# Patient Record
Sex: FEMALE | Race: WHITE | Employment: OTHER
[De-identification: names, ages, dates, MRNs, and addresses within clinical notes are randomized per-mention and may not be internally consistent; named-entity substitution may affect disease eponyms.]

---

## 2018-01-31 ENCOUNTER — SURGERY (OUTPATIENT)
Age: 78
End: 2018-01-31

## 2018-01-31 ENCOUNTER — HOSPITAL ENCOUNTER (OUTPATIENT)
Facility: CLINIC | Age: 78
Discharge: HOME | End: 2018-01-31
Attending: COLON & RECTAL SURGERY | Admitting: COLON & RECTAL SURGERY
Payer: MEDICARE

## 2018-01-31 VITALS
WEIGHT: 148 LBS | BODY MASS INDEX: 23.78 KG/M2 | HEIGHT: 66 IN | OXYGEN SATURATION: 99 % | DIASTOLIC BLOOD PRESSURE: 60 MMHG | SYSTOLIC BLOOD PRESSURE: 90 MMHG | RESPIRATION RATE: 28 BRPM

## 2018-01-31 LAB — COLONOSCOPY: NORMAL

## 2018-01-31 PROCEDURE — G0500 MOD SEDAT ENDO SERVICE >5YRS: HCPCS | Performed by: COLON & RECTAL SURGERY

## 2018-01-31 PROCEDURE — 88305 TISSUE EXAM BY PATHOLOGIST: CPT | Performed by: COLON & RECTAL SURGERY

## 2018-01-31 PROCEDURE — 25000128 H RX IP 250 OP 636: Performed by: COLON & RECTAL SURGERY

## 2018-01-31 PROCEDURE — 88305 TISSUE EXAM BY PATHOLOGIST: CPT | Mod: 26 | Performed by: COLON & RECTAL SURGERY

## 2018-01-31 PROCEDURE — 45380 COLONOSCOPY AND BIOPSY: CPT | Performed by: COLON & RECTAL SURGERY

## 2018-01-31 PROCEDURE — 45385 COLONOSCOPY W/LESION REMOVAL: CPT | Performed by: COLON & RECTAL SURGERY

## 2018-01-31 RX ORDER — ONDANSETRON 2 MG/ML
4 INJECTION INTRAMUSCULAR; INTRAVENOUS
Status: DISCONTINUED | OUTPATIENT
Start: 2018-01-31 | End: 2018-01-31 | Stop reason: HOSPADM

## 2018-01-31 RX ORDER — FENTANYL CITRATE 50 UG/ML
INJECTION, SOLUTION INTRAMUSCULAR; INTRAVENOUS PRN
Status: DISCONTINUED | OUTPATIENT
Start: 2018-01-31 | End: 2018-01-31 | Stop reason: HOSPADM

## 2018-01-31 RX ORDER — FLUMAZENIL 0.1 MG/ML
0.2 INJECTION, SOLUTION INTRAVENOUS
Status: DISCONTINUED | OUTPATIENT
Start: 2018-01-31 | End: 2018-01-31 | Stop reason: HOSPADM

## 2018-01-31 RX ORDER — LIDOCAINE 40 MG/G
CREAM TOPICAL
Status: DISCONTINUED | OUTPATIENT
Start: 2018-01-31 | End: 2018-01-31 | Stop reason: HOSPADM

## 2018-01-31 RX ORDER — NALOXONE HYDROCHLORIDE 0.4 MG/ML
.1-.4 INJECTION, SOLUTION INTRAMUSCULAR; INTRAVENOUS; SUBCUTANEOUS
Status: DISCONTINUED | OUTPATIENT
Start: 2018-01-31 | End: 2018-01-31 | Stop reason: HOSPADM

## 2018-01-31 RX ORDER — LOSARTAN POTASSIUM 25 MG/1
12.5 TABLET ORAL
COMMUNITY
Start: 2017-02-03

## 2018-01-31 RX ORDER — ONDANSETRON 2 MG/ML
4 INJECTION INTRAMUSCULAR; INTRAVENOUS EVERY 6 HOURS PRN
Status: DISCONTINUED | OUTPATIENT
Start: 2018-01-31 | End: 2018-01-31 | Stop reason: HOSPADM

## 2018-01-31 RX ORDER — ONDANSETRON 4 MG/1
4 TABLET, ORALLY DISINTEGRATING ORAL EVERY 6 HOURS PRN
Status: DISCONTINUED | OUTPATIENT
Start: 2018-01-31 | End: 2018-01-31 | Stop reason: HOSPADM

## 2018-01-31 RX ORDER — PENCICLOVIR 10 MG/G
CREAM TOPICAL
COMMUNITY
Start: 2017-12-11

## 2018-01-31 RX ADMIN — MIDAZOLAM 2 MG: 1 INJECTION INTRAMUSCULAR; INTRAVENOUS at 09:40

## 2018-01-31 RX ADMIN — FENTANYL CITRATE 100 MCG: 50 INJECTION, SOLUTION INTRAMUSCULAR; INTRAVENOUS at 09:40

## 2018-01-31 NOTE — H&P
Pre-Endoscopy History and Physical     Marilee Centeno MRN# [id number]   YOB: 1940 Age: 77 year old     Date of Procedure: 1/31/2018  Primary care provider: Irene Ritter  Type of Endoscopy: colonoscopy  Reason for Procedure: screening  Type of Anesthesia Anticipated: moderate sedation    HPI:    Marilee is a 77 year old female who will be undergoing the above procedure.  Patient has had adenomatous polyps in the past. Patient denies a change in her bowel habits or bleeding.     A history and physical has been performed. The patient's medications and allergies have been reviewed. The risks and benefits of the procedure and the sedation options and risks were discussed with the patient.  All questions were answered and informed consent was obtained.      She denies a personal or family history of anesthesia complications or bleeding disorders.   Prior to Admission medications    Medication Sig Start Date End Date Taking? Authorizing Provider   losartan (COZAAR) 25 MG tablet Take 12.5 mg by mouth 2/3/17  Yes Reported, Patient   penciclovir (DENAVIR) 1 % cream  12/11/17  Yes Reported, Patient   clonazePAM (KLONOPIN WAFER) 0.125 MG TBDP Take 0.125 mg by mouth daily As needed for anxiety 9/18/13  Yes Urmila Frost MD   Calcium Carbonate-Vitamin D (CALCIUM + D PO) Take  by mouth 2 times daily.   Yes Reported, Patient   atorvastatin (LIPITOR) 10 MG tablet Take 10 mg by mouth daily.   Yes Reported, Patient   Multiple Vitamin (MULTIVITAMIN OR) Take 1 tablet by mouth daily.   Yes Reported, Patient   ALBUTEROL IN Inhale  into the lungs as needed.   Yes Reported, Patient   Montelukast Sodium (SINGULAIR PO) Take  by mouth as needed.   Yes Reported, Patient   mometasone (NASONEX) 50 MCG/ACT nasal spray 2 sprays by Both Nostrils route as needed.    Reported, Patient       Allergies   Allergen Reactions     Amoxicillin      Facial swelling and facial flushing     Cortisone      Facial flushing and  "swelling     Medrol [Methylprednisolone]      Facial swelling     Meloxicam      Other reaction(s): Hypertension     Methylprednisolone      Other reaction(s): Flushing  Last 3 days     Nsaids      Other reaction(s): GI Upset        No current facility-administered medications for this encounter.        Patient Active Problem List   Diagnosis     Malignant neoplasm of breast (H)        Past Medical History:   Diagnosis Date     Arthritis     Osteoarthritis     Asthma     Exercise/Seasonally Induced Asthma     Malignant neoplasm (H) 9/30/1996    Breast Cancer        Past Surgical History:   Procedure Laterality Date     APPENDECTOMY  age 15    Appendectomy     BIOPSY  9/30/1996    Right Breast Biopsy     BIOPSY  10/14/2009    Colon Polyp - Benign     BIOPSY  7/2012    medistinal-      BREAST SURGERY  10/10/1996    Right Lumpectomy     BREAST SURGERY  1981    Bilateral Breast Reduction     COLONOSCOPY  10/2009    Colonoscopy     COLONOSCOPY  1/23/2013    Procedure: COLONOSCOPY;  COLONOSCOPY ;  Surgeon: Camila Iyer MD;  Location:  GI     COSMETIC SURGERY  2008    Upper Eyelids     EYE SURGERY      cataract surgery     GYN SURGERY  2003    GERMAIN/BSO     ORTHOPEDIC SURGERY  1985    Right Knee repair       Social History   Substance Use Topics     Smoking status: Never Smoker     Smokeless tobacco: Never Used     Alcohol use Yes       Family History   Problem Relation Age of Onset     Cancer - colorectal Mother      C.A.D. Father      Colon Cancer Maternal Aunt        REVIEW OF SYSTEMS:     5 point ROS negative except as noted above in HPI, including Gen., Resp., CV, GI &  system review. Had pneumonia this summer.       PHYSICAL EXAM:   /73  Resp 16  Ht 1.676 m (5' 6\")  Wt 67.1 kg (148 lb)  LMP 11/10/1990  SpO2 99%  BMI 23.89 kg/m2 Estimated body mass index is 23.89 kg/(m^2) as calculated from the following:    Height as of this encounter: 1.676 m (5' 6\").    Weight as of this encounter: 67.1 kg " (148 lb).   GENERAL APPEARANCE: healthy  MENTAL STATUS: alert  AIRWAY EXAM: Mallampatti Class II (visualization of the soft palate, fauces, and uvula)  RESP: lungs clear to auscultation - no rales, rhonchi or wheezes  CV: regular rates and rhythm      DIAGNOSTICS:    Not indicated      IMPRESSION   ASA Class 2 - Mild systemic disease        PLAN:       Colonoscopy with possible polypectomy, possible biopsy. The indications, procedure and risks were explained to the patient who agrees to proceed.       The above has been forwarded to the consulting provider.      Signed Electronically by: Camila Iyer  January 31, 2018

## 2018-01-31 NOTE — BRIEF OP NOTE
[facility] Brief Operative Note    Pre-operative diagnosis: FAMILY HISTORY OF COLORECTAL CANCER AND PERSONAL HISTORY OF COLONIC POLYPS    Post-operative diagnosis colon polyps     Procedure: Procedure(s):  COLONOSCOPY - Wound Class: II-Clean Contaminated   - Wound Class: II-Clean Contaminated   Surgeon(s): Surgeon(s) and Role:     * Camila Iyer MD - Primary   Estimated blood loss: * No values recorded between 1/31/2018 12:00 AM and 1/31/2018 10:02 AM *    Specimens:   ID Type Source Tests Collected by Time Destination   A :  Polyp Large Intestine, Hepatic Flexure SURGICAL PATHOLOGY EXAM Camila Iyer MD 1/31/2018  9:59 AM    B :  Polyp Large Intestine, Transverse SURGICAL PATHOLOGY EXAM Camila Iyer MD 1/31/2018 10:00 AM       Findings: See Provation procedure note in Epic

## 2018-02-01 LAB — COPATH REPORT: NORMAL

## 2020-09-09 DIAGNOSIS — Z11.59 ENCOUNTER FOR SCREENING FOR OTHER VIRAL DISEASES: Primary | ICD-10-CM

## 2021-03-14 ENCOUNTER — HEALTH MAINTENANCE LETTER (OUTPATIENT)
Age: 81
End: 2021-03-14

## 2021-04-06 DIAGNOSIS — Z11.59 ENCOUNTER FOR SCREENING FOR OTHER VIRAL DISEASES: ICD-10-CM

## 2021-04-17 DIAGNOSIS — Z11.59 ENCOUNTER FOR SCREENING FOR OTHER VIRAL DISEASES: ICD-10-CM

## 2021-04-17 LAB
SARS-COV-2 RNA RESP QL NAA+PROBE: NORMAL
SPECIMEN SOURCE: NORMAL

## 2021-04-17 PROCEDURE — U0003 INFECTIOUS AGENT DETECTION BY NUCLEIC ACID (DNA OR RNA); SEVERE ACUTE RESPIRATORY SYNDROME CORONAVIRUS 2 (SARS-COV-2) (CORONAVIRUS DISEASE [COVID-19]), AMPLIFIED PROBE TECHNIQUE, MAKING USE OF HIGH THROUGHPUT TECHNOLOGIES AS DESCRIBED BY CMS-2020-01-R: HCPCS | Performed by: COLON & RECTAL SURGERY

## 2021-04-17 PROCEDURE — U0005 INFEC AGEN DETEC AMPLI PROBE: HCPCS | Performed by: COLON & RECTAL SURGERY

## 2021-04-18 LAB
LABORATORY COMMENT REPORT: NORMAL
SARS-COV-2 RNA RESP QL NAA+PROBE: NEGATIVE
SPECIMEN SOURCE: NORMAL

## 2021-04-19 ENCOUNTER — TELEPHONE (OUTPATIENT)
Dept: EMERGENCY MEDICINE | Facility: CLINIC | Age: 81
End: 2021-04-19

## 2021-04-19 NOTE — TELEPHONE ENCOUNTER
Patient called in to the Covid results line requesting Covid results.  Patient was tested for a planned procedure and results were negative. No other questions or concerns.   Meg Fuentes LPN

## 2021-04-21 ENCOUNTER — HOSPITAL ENCOUNTER (OUTPATIENT)
Facility: CLINIC | Age: 81
Discharge: HOME | End: 2021-04-21
Attending: COLON & RECTAL SURGERY | Admitting: COLON & RECTAL SURGERY
Payer: COMMERCIAL

## 2021-04-21 VITALS — OXYGEN SATURATION: 98 % | DIASTOLIC BLOOD PRESSURE: 65 MMHG | SYSTOLIC BLOOD PRESSURE: 104 MMHG | HEART RATE: 64 BPM

## 2021-04-21 PROBLEM — C37: Status: ACTIVE | Noted: 2017-08-14

## 2021-04-21 PROBLEM — J45.20 MILD INTERMITTENT ASTHMA WITHOUT COMPLICATION: Status: ACTIVE | Noted: 2018-12-27

## 2021-04-21 PROBLEM — M17.11 PRIMARY OSTEOARTHRITIS OF RIGHT KNEE: Status: ACTIVE | Noted: 2017-09-26

## 2021-04-21 PROBLEM — F41.9 ANXIETY: Status: ACTIVE | Noted: 2018-04-17

## 2021-04-21 LAB — COLONOSCOPY: NORMAL

## 2021-04-21 PROCEDURE — 45378 DIAGNOSTIC COLONOSCOPY: CPT | Performed by: COLON & RECTAL SURGERY

## 2021-04-21 PROCEDURE — G0500 MOD SEDAT ENDO SERVICE >5YRS: HCPCS | Performed by: COLON & RECTAL SURGERY

## 2021-04-21 PROCEDURE — G0105 COLORECTAL SCRN; HI RISK IND: HCPCS | Performed by: COLON & RECTAL SURGERY

## 2021-04-21 PROCEDURE — 250N000011 HC RX IP 250 OP 636: Performed by: COLON & RECTAL SURGERY

## 2021-04-21 RX ORDER — NALOXONE HYDROCHLORIDE 0.4 MG/ML
0.4 INJECTION, SOLUTION INTRAMUSCULAR; INTRAVENOUS; SUBCUTANEOUS
Status: DISCONTINUED | OUTPATIENT
Start: 2021-04-21 | End: 2021-04-21 | Stop reason: HOSPADM

## 2021-04-21 RX ORDER — NALOXONE HYDROCHLORIDE 0.4 MG/ML
0.2 INJECTION, SOLUTION INTRAMUSCULAR; INTRAVENOUS; SUBCUTANEOUS
Status: DISCONTINUED | OUTPATIENT
Start: 2021-04-21 | End: 2021-04-21 | Stop reason: HOSPADM

## 2021-04-21 RX ORDER — ONDANSETRON 2 MG/ML
4 INJECTION INTRAMUSCULAR; INTRAVENOUS
Status: DISCONTINUED | OUTPATIENT
Start: 2021-04-21 | End: 2021-04-21 | Stop reason: HOSPADM

## 2021-04-21 RX ORDER — ONDANSETRON 2 MG/ML
4 INJECTION INTRAMUSCULAR; INTRAVENOUS EVERY 6 HOURS PRN
Status: DISCONTINUED | OUTPATIENT
Start: 2021-04-21 | End: 2021-04-21 | Stop reason: HOSPADM

## 2021-04-21 RX ORDER — LIDOCAINE 40 MG/G
CREAM TOPICAL
Status: DISCONTINUED | OUTPATIENT
Start: 2021-04-21 | End: 2021-04-21 | Stop reason: HOSPADM

## 2021-04-21 RX ORDER — FENTANYL CITRATE 50 UG/ML
INJECTION, SOLUTION INTRAMUSCULAR; INTRAVENOUS PRN
Status: COMPLETED | OUTPATIENT
Start: 2021-04-21 | End: 2021-04-21

## 2021-04-21 RX ORDER — ONDANSETRON 4 MG/1
4 TABLET, ORALLY DISINTEGRATING ORAL EVERY 6 HOURS PRN
Status: DISCONTINUED | OUTPATIENT
Start: 2021-04-21 | End: 2021-04-21 | Stop reason: HOSPADM

## 2021-04-21 RX ORDER — PROCHLORPERAZINE MALEATE 5 MG
5 TABLET ORAL EVERY 6 HOURS PRN
Status: DISCONTINUED | OUTPATIENT
Start: 2021-04-21 | End: 2021-04-21 | Stop reason: HOSPADM

## 2021-04-21 RX ORDER — FLUMAZENIL 0.1 MG/ML
0.2 INJECTION, SOLUTION INTRAVENOUS
Status: DISCONTINUED | OUTPATIENT
Start: 2021-04-21 | End: 2021-04-21 | Stop reason: HOSPADM

## 2021-04-21 RX ADMIN — MIDAZOLAM 1 MG: 1 INJECTION INTRAMUSCULAR; INTRAVENOUS at 08:05

## 2021-04-21 RX ADMIN — FENTANYL CITRATE 100 MCG: 50 INJECTION, SOLUTION INTRAMUSCULAR; INTRAVENOUS at 07:58

## 2021-04-21 RX ADMIN — MIDAZOLAM 1 MG: 1 INJECTION INTRAMUSCULAR; INTRAVENOUS at 07:58

## 2021-04-21 NOTE — H&P
Pre-Endoscopy History and Physical     Marilee Centeno MRN# [id number]   YOB: 1940 Age: 80 year old     Date of Procedure: 4/21/2021  Primary care provider: Irene Ritter  Type of Endoscopy: colonoscopy  Reason for Procedure: screening, history of polyps, family history of colon cancer  Type of Anesthesia Anticipated: moderate sedation    HPI:    Marilee is a 80 year old female who will be undergoing the above procedure.  Patient pola a change inher bowel habits or bleeding.         She denies a personal or family history of anesthesia complications or bleeding disorders.   Prior to Admission medications    Medication Sig Start Date End Date Taking? Authorizing Provider   ALBUTEROL IN Inhale  into the lungs as needed.   Yes Reported, Patient   atorvastatin (LIPITOR) 10 MG tablet Take 10 mg by mouth daily.   Yes Reported, Patient   Calcium Carbonate-Vitamin D (CALCIUM + D PO) Take  by mouth 2 times daily.   Yes Reported, Patient   clonazePAM (KLONOPIN WAFER) 0.125 MG TBDP Take 0.125 mg by mouth daily As needed for anxiety 9/18/13  Yes Urmila Frost MD   losartan (COZAAR) 25 MG tablet Take 12.5 mg by mouth 2/3/17  Yes Reported, Patient   mometasone (NASONEX) 50 MCG/ACT nasal spray 2 sprays by Both Nostrils route as needed.   Yes Reported, Patient   Montelukast Sodium (SINGULAIR PO) Take  by mouth as needed.   Yes Reported, Patient   Multiple Vitamin (MULTIVITAMIN OR) Take 1 tablet by mouth daily.   Yes Reported, Patient   penciclovir (DENAVIR) 1 % cream  12/11/17  Yes Reported, Patient       Allergies   Allergen Reactions     Amoxicillin      Facial swelling and facial flushing     Cortisone      Facial flushing and swelling     Medrol [Methylprednisolone]      Facial swelling     Meloxicam      Other reaction(s): Hypertension     Methylprednisolone      Other reaction(s): Flushing  Last 3 days     Nsaids      Other reaction(s): GI Upset        Current Facility-Administered Medications    Medication     lidocaine (LMX4) cream     lidocaine 1 % 0.1-1 mL     ondansetron (ZOFRAN) injection 4 mg     sodium chloride (PF) 0.9% PF flush 3 mL     sodium chloride (PF) 0.9% PF flush 3 mL     sodium chloride (PF) 0.9% PF flush 3 mL       Patient Active Problem List   Diagnosis     Malignant neoplasm of breast (H)     Anxiety     Encounter for screening for osteoporosis     Family history of malignant neoplasm of gastrointestinal tract     Helicobacter pylori infection     History of colonic polyps     Hyperlipidemia     Hypertension     Lumbar spinal stenosis     Macrocytosis     Malignant thymoma (H)     Migraine variant     Mild intermittent asthma without complication     Other and unspecified disc disorder of cervical region     Abdominal pain     Other chest pain     Primary osteoarthritis of right knee     Primary spindle cell thymoma of mediastinum (H)     Malignant neoplasm of female breast (H)        Past Medical History:   Diagnosis Date     Arthritis     Osteoarthritis     Asthma     Exercise/Seasonally Induced Asthma     Hypertension      Malignant neoplasm (H) 9/30/1996    Breast Cancer        Past Surgical History:   Procedure Laterality Date     APPENDECTOMY  age 15    Appendectomy     BIOPSY  9/30/1996    Right Breast Biopsy     BIOPSY  10/14/2009    Colon Polyp - Benign     BIOPSY  7/2012    medistinal-      BREAST SURGERY  10/10/1996    Right Lumpectomy     BREAST SURGERY  1981    Bilateral Breast Reduction     COLONOSCOPY  10/2009    Colonoscopy     COLONOSCOPY  1/23/2013    Procedure: COLONOSCOPY;  COLONOSCOPY ;  Surgeon: Camila Iyer MD;  Location:  GI     COLONOSCOPY N/A 1/31/2018    Procedure: COMBINED COLONOSCOPY, SINGLE OR MULTIPLE BIOPSY/POLYPECTOMY BY BIOPSY;;  Surgeon: Camila Iyer MD;  Location:  GI     COSMETIC SURGERY  2008    Upper Eyelids     EYE SURGERY      cataract surgery     GYN SURGERY  2003    GERMAIN/BSO     ORTHOPEDIC SURGERY  1985    Right Knee  "repair       Social History     Tobacco Use     Smoking status: Never Smoker     Smokeless tobacco: Never Used   Substance Use Topics     Alcohol use: Yes     Comment: occ       Family History   Problem Relation Age of Onset     Cancer - colorectal Mother      C.A.D. Father      Colon Cancer Maternal Aunt        REVIEW OF SYSTEMS:     5 point ROS negative except as noted above in HPI, including Gen., Resp., CV, GI &  system review.      PHYSICAL EXAM:   BP (!) 148/82   Resp 16   LMP 11/10/1990   SpO2 98%  Estimated body mass index is 23.89 kg/m  as calculated from the following:    Height as of 1/31/18: 1.676 m (5' 6\").    Weight as of 1/31/18: 67.1 kg (148 lb).   GENERAL APPEARANCE: healthy  MENTAL STATUS: alert  AIRWAY EXAM: Mallampatti Class II (visualization of the soft palate, fauces, and uvula)  RESP: lungs clear to auscultation - no rales, rhonchi or wheezes  CV: regular rates and rhythm      DIAGNOSTICS:    Not indicated      IMPRESSION   ASA Class 2 - Mild systemic disease        PLAN:       A history and physical has been performed. The patient's medications and allergies have been reviewed. The risks and benefits of the procedure and the sedation options and risks were discussed with the patient.  All questions were answered and informed consent was obtained.  Colonoscopy with possible polypectomy, possible biopsy. The indications, procedure and risks were explained to the patient who agrees to proceed.       The above has been forwarded to the consulting provider.      Signed Electronically by: Camila Iyer MD  April 21, 2021          "

## 2021-04-21 NOTE — BRIEF OP NOTE
[facility]    Brief Operative Note    Pre-operative diagnosis: Hx of colonic polyps [Z86.010]  Post-operative diagnosis normal colon    Procedure: Procedure(s):  COLONOSCOPY  Surgeon: Surgeon(s) and Role:     * Camila Iyer MD - Primary  Anesthesia: Conscious Sedation   Estimated blood loss: None  Drains: None  Specimens: * No specimens in log *  Findings:   See Provation procedure note in Epic    Complications: None.  Implants: * No implants in log *

## 2021-10-24 ENCOUNTER — HEALTH MAINTENANCE LETTER (OUTPATIENT)
Age: 81
End: 2021-10-24

## 2022-04-09 ENCOUNTER — HEALTH MAINTENANCE LETTER (OUTPATIENT)
Age: 82
End: 2022-04-09

## 2022-10-10 ENCOUNTER — HEALTH MAINTENANCE LETTER (OUTPATIENT)
Age: 82
End: 2022-10-10

## 2023-05-27 ENCOUNTER — HEALTH MAINTENANCE LETTER (OUTPATIENT)
Age: 83
End: 2023-05-27

## 2024-03-17 ENCOUNTER — HEALTH MAINTENANCE LETTER (OUTPATIENT)
Age: 84
End: 2024-03-17

## 2024-10-21 ENCOUNTER — HOSPITAL ENCOUNTER (EMERGENCY)
Facility: CLINIC | Age: 84
Discharge: HOME | End: 2024-10-21
Admitting: EMERGENCY MEDICINE
Payer: COMMERCIAL

## 2024-10-21 ASSESSMENT — COLUMBIA-SUICIDE SEVERITY RATING SCALE - C-SSRS
2. HAVE YOU ACTUALLY HAD ANY THOUGHTS OF KILLING YOURSELF IN THE PAST MONTH?: NO
1. IN THE PAST MONTH, HAVE YOU WISHED YOU WERE DEAD OR WISHED YOU COULD GO TO SLEEP AND NOT WAKE UP?: NO
6. HAVE YOU EVER DONE ANYTHING, STARTED TO DO ANYTHING, OR PREPARED TO DO ANYTHING TO END YOUR LIFE?: NO

## 2024-10-21 ASSESSMENT — ACTIVITIES OF DAILY LIVING (ADL): ADLS_ACUITY_SCORE: 35

## 2024-12-21 ENCOUNTER — HEALTH MAINTENANCE LETTER (OUTPATIENT)
Age: 84
End: 2024-12-21

## 2025-02-28 ENCOUNTER — HOSPITAL ENCOUNTER (EMERGENCY)
Facility: CLINIC | Age: 85
Discharge: HOME | End: 2025-02-28
Attending: EMERGENCY MEDICINE | Admitting: EMERGENCY MEDICINE
Payer: COMMERCIAL

## 2025-02-28 ENCOUNTER — APPOINTMENT (OUTPATIENT)
Dept: GENERAL RADIOLOGY | Facility: CLINIC | Age: 85
End: 2025-02-28
Attending: EMERGENCY MEDICINE
Payer: COMMERCIAL

## 2025-02-28 VITALS
RESPIRATION RATE: 16 BRPM | TEMPERATURE: 98.1 F | WEIGHT: 140 LBS | OXYGEN SATURATION: 100 % | DIASTOLIC BLOOD PRESSURE: 78 MMHG | BODY MASS INDEX: 27.48 KG/M2 | HEART RATE: 70 BPM | HEIGHT: 60 IN | SYSTOLIC BLOOD PRESSURE: 156 MMHG

## 2025-02-28 DIAGNOSIS — Z63.6 CAREGIVER STRESS: ICD-10-CM

## 2025-02-28 DIAGNOSIS — G56.01 CARPAL TUNNEL SYNDROME OF RIGHT WRIST: ICD-10-CM

## 2025-02-28 DIAGNOSIS — R07.89 CHEST DISCOMFORT: ICD-10-CM

## 2025-02-28 LAB
ALBUMIN SERPL BCG-MCNC: 4.1 G/DL (ref 3.5–5.2)
ALP SERPL-CCNC: 105 U/L (ref 40–150)
ALT SERPL W P-5'-P-CCNC: 16 U/L (ref 0–50)
ANION GAP SERPL CALCULATED.3IONS-SCNC: 11 MMOL/L (ref 7–15)
AST SERPL W P-5'-P-CCNC: 26 U/L (ref 0–45)
ATRIAL RATE - MUSE: 74 BPM
BASOPHILS # BLD AUTO: 0.1 10E3/UL (ref 0–0.2)
BASOPHILS NFR BLD AUTO: 1 %
BILIRUB SERPL-MCNC: 0.4 MG/DL
BUN SERPL-MCNC: 14.9 MG/DL (ref 8–23)
CALCIUM SERPL-MCNC: 8.9 MG/DL (ref 8.8–10.4)
CHLORIDE SERPL-SCNC: 103 MMOL/L (ref 98–107)
CREAT SERPL-MCNC: 0.7 MG/DL (ref 0.51–0.95)
DIASTOLIC BLOOD PRESSURE - MUSE: NORMAL MMHG
EGFRCR SERPLBLD CKD-EPI 2021: 85 ML/MIN/1.73M2
EOSINOPHIL # BLD AUTO: 0 10E3/UL (ref 0–0.7)
EOSINOPHIL NFR BLD AUTO: 1 %
ERYTHROCYTE [DISTWIDTH] IN BLOOD BY AUTOMATED COUNT: 12.4 % (ref 10–15)
GLUCOSE SERPL-MCNC: 101 MG/DL (ref 70–99)
HCO3 SERPL-SCNC: 25 MMOL/L (ref 22–29)
HCT VFR BLD AUTO: 34.2 % (ref 35–47)
HGB BLD-MCNC: 12 G/DL (ref 11.7–15.7)
HOLD SPECIMEN: NORMAL
IMM GRANULOCYTES # BLD: 0 10E3/UL
IMM GRANULOCYTES NFR BLD: 0 %
INTERPRETATION ECG - MUSE: NORMAL
LIPASE SERPL-CCNC: 46 U/L (ref 13–60)
LYMPHOCYTES # BLD AUTO: 1.2 10E3/UL (ref 0.8–5.3)
LYMPHOCYTES NFR BLD AUTO: 19 %
MCH RBC QN AUTO: 34.9 PG (ref 26.5–33)
MCHC RBC AUTO-ENTMCNC: 35.1 G/DL (ref 31.5–36.5)
MCV RBC AUTO: 99 FL (ref 78–100)
MONOCYTES # BLD AUTO: 0.2 10E3/UL (ref 0–1.3)
MONOCYTES NFR BLD AUTO: 3 %
NEUTROPHILS # BLD AUTO: 4.7 10E3/UL (ref 1.6–8.3)
NEUTROPHILS NFR BLD AUTO: 76 %
NRBC # BLD AUTO: 0 10E3/UL
NRBC BLD AUTO-RTO: 0 /100
P AXIS - MUSE: 76 DEGREES
PLATELET # BLD AUTO: 264 10E3/UL (ref 150–450)
POTASSIUM SERPL-SCNC: 4.2 MMOL/L (ref 3.4–5.3)
PR INTERVAL - MUSE: 162 MS
PROT SERPL-MCNC: 7.1 G/DL (ref 6.4–8.3)
QRS DURATION - MUSE: 82 MS
QT - MUSE: 392 MS
QTC - MUSE: 435 MS
R AXIS - MUSE: 61 DEGREES
RBC # BLD AUTO: 3.44 10E6/UL (ref 3.8–5.2)
SODIUM SERPL-SCNC: 139 MMOL/L (ref 135–145)
SYSTOLIC BLOOD PRESSURE - MUSE: NORMAL MMHG
T AXIS - MUSE: 80 DEGREES
TROPONIN T SERPL HS-MCNC: 11 NG/L
TROPONIN T SERPL HS-MCNC: 12 NG/L
VENTRICULAR RATE- MUSE: 74 BPM
WBC # BLD AUTO: 6.2 10E3/UL (ref 4–11)

## 2025-02-28 PROCEDURE — 84484 ASSAY OF TROPONIN QUANT: CPT | Performed by: EMERGENCY MEDICINE

## 2025-02-28 PROCEDURE — 82040 ASSAY OF SERUM ALBUMIN: CPT | Performed by: EMERGENCY MEDICINE

## 2025-02-28 PROCEDURE — 71046 X-RAY EXAM CHEST 2 VIEWS: CPT

## 2025-02-28 PROCEDURE — 36415 COLL VENOUS BLD VENIPUNCTURE: CPT | Performed by: EMERGENCY MEDICINE

## 2025-02-28 PROCEDURE — 99285 EMERGENCY DEPT VISIT HI MDM: CPT | Mod: 25

## 2025-02-28 PROCEDURE — 93005 ELECTROCARDIOGRAM TRACING: CPT

## 2025-02-28 PROCEDURE — 85048 AUTOMATED LEUKOCYTE COUNT: CPT | Performed by: EMERGENCY MEDICINE

## 2025-02-28 PROCEDURE — 82247 BILIRUBIN TOTAL: CPT | Performed by: EMERGENCY MEDICINE

## 2025-02-28 PROCEDURE — 83690 ASSAY OF LIPASE: CPT | Performed by: EMERGENCY MEDICINE

## 2025-02-28 PROCEDURE — 85004 AUTOMATED DIFF WBC COUNT: CPT | Performed by: EMERGENCY MEDICINE

## 2025-02-28 SDOH — SOCIAL STABILITY - SOCIAL INSECURITY: DEPENDENT RELATIVE NEEDING CARE AT HOME: Z63.6

## 2025-02-28 ASSESSMENT — ACTIVITIES OF DAILY LIVING (ADL)
ADLS_ACUITY_SCORE: 41

## 2025-02-28 NOTE — ED PROVIDER NOTES
"  Emergency Department Note      History of Present Illness     Chief Complaint:  Chest pain    HPI   Marilee Centeno is a 84 year old female without known cardiac disease who presents emergency department for evaluation of chest discomfort.  She states that around 730 this morning, she was at home when she developed a flare of discomfort in her right wrist which was identical to prior flares of \"carpal tunnel\" which she has had intermittently for a long time, she went to take a shower and felt that the discomfort was rising up her right upper extremity into her right neck, then it all went away, she developed some pressure in her chest that has since completely resolved.  No fevers or cough.  She volunteers that she thinks this is probably from stress, she notes that her  is terminally ill and is on hospice, this is caused significant stress over the last few months, she has someone currently at home watching him.  She does not want to be hospitalized.  She states she is aware that women can have atypical symptoms of heart problems and this is what prompted her visit.  She does not smoke or drink.  No leg swelling, no history of DVT or PE.  She feels she is recovering well from her back surgery.    Review of External Notes: I personally reviewed prior records including 2019 stress test at which time there was no evidence of ischemia, ejection fraction was 65%.  I also reviewed discharge summary from her 1 night hospitalization after lumbar decompression surgery at [facility].    Past Medical History     Medical History and Problem List   Past Medical History:   Diagnosis Date    Arthritis     Asthma     Hypertension     Malignant neoplasm (H) 9/30/1996     Medications   ALBUTEROL IN  atorvastatin (LIPITOR) 10 MG tablet  Calcium Carbonate-Vitamin D (CALCIUM + D PO)  clonazePAM (KLONOPIN WAFER) 0.125 MG TBDP  losartan (COZAAR) 25 MG tablet  mometasone (NASONEX) 50 MCG/ACT nasal spray  Montelukast Sodium " (SINGULAIR PO)  Multiple Vitamin (MULTIVITAMIN OR)  penciclovir (DENAVIR) 1 % cream      Surgical History   Past Surgical History:   Procedure Laterality Date    APPENDECTOMY  age 15    Appendectomy    BIOPSY  9/30/1996    Right Breast Biopsy    BIOPSY  10/14/2009    Colon Polyp - Benign    BIOPSY  7/2012    medistinal-     BREAST SURGERY  10/10/1996    Right Lumpectomy    BREAST SURGERY  1981    Bilateral Breast Reduction    COLONOSCOPY  10/2009    Colonoscopy    COLONOSCOPY  1/23/2013    Procedure: COLONOSCOPY;  COLONOSCOPY ;  Surgeon: Camila Iyer MD;  Location:  GI    COLONOSCOPY N/A 1/31/2018    Procedure: COMBINED COLONOSCOPY, SINGLE OR MULTIPLE BIOPSY/POLYPECTOMY BY BIOPSY;;  Surgeon: Camila Iyer MD;  Location:  GI    COLONOSCOPY N/A 4/21/2021    Procedure: COLONOSCOPY;  Surgeon: Camila Iyer MD;  Location:  GI    COSMETIC SURGERY  2008    Upper Eyelids    EYE SURGERY      cataract surgery    GYN SURGERY  2003    GERMAIN/BSO    ORTHOPEDIC SURGERY  1985    Right Knee repair     Physical Exam     Patient Vitals for the past 24 hrs:   BP Temp Temp src Pulse Resp SpO2 Height Weight   02/28/25 1755 -- -- -- 70 -- 100 % -- --   02/28/25 1114 (!) 156/78 98.1  F (36.7  C) Temporal 74 16 98 % 1.524 m (5') 63.5 kg (140 lb)     Physical Exam  General: Nontoxic-appearing woman sitting upright  HENT: mucous membranes moist  CV: rate as above, regular rhythm, no lower or upper extremity edema, no JVD, palpable symmetric radial pulses  Resp: normal effort, speaks in full phrases, no stridor, no cough observed  GI: abdomen soft and nontender, no guarding, negative Villegas's sign  MSK: no bony tenderness to chest, FROM neck and R shoulder and all joints in RUE  Skin: appropriately warm and dry, no erythema or vesicles to chest wall  Neuro: awake, alert, clear speech, fully oriented, face symmetric,  normal, strength and sensation intact in all extr, no nuchal rigidity  Psych:  cooperative    Diagnostics   Electrocardiogram  ECG taken at 1102, ECG interpreted at 1345 by YOANA Stroud MD  Sinus rhythm, no ST elevation  Rate 74 bpm. ND interval 62. QRS duration 82. QTc 435    Lab Results   Labs Ordered and Resulted from Time of ED Arrival to Time of ED Departure   COMPREHENSIVE METABOLIC PANEL - Abnormal       Result Value    Sodium 139      Potassium 4.2      Carbon Dioxide (CO2) 25      Anion Gap 11      Urea Nitrogen 14.9      Creatinine 0.70      GFR Estimate 85      Calcium 8.9      Chloride 103      Glucose 101 (*)     Alkaline Phosphatase 105      AST 26      ALT 16      Protein Total 7.1      Albumin 4.1      Bilirubin Total 0.4     CBC WITH PLATELETS AND DIFFERENTIAL - Abnormal    WBC Count 6.2      RBC Count 3.44 (*)     Hemoglobin 12.0      Hematocrit 34.2 (*)     MCV 99      MCH 34.9 (*)     MCHC 35.1      RDW 12.4      Platelet Count 264      % Neutrophils 76      % Lymphocytes 19      % Monocytes 3      % Eosinophils 1      % Basophils 1      % Immature Granulocytes 0      NRBCs per 100 WBC 0      Absolute Neutrophils 4.7      Absolute Lymphocytes 1.2      Absolute Monocytes 0.2      Absolute Eosinophils 0.0      Absolute Basophils 0.1      Absolute Immature Granulocytes 0.0      Absolute NRBCs 0.0     TROPONIN T, HIGH SENSITIVITY - Normal    Troponin T, High Sensitivity 11     LIPASE - Normal    Lipase 46     TROPONIN T, HIGH SENSITIVITY - Normal    Troponin T, High Sensitivity 12       Imaging   XR Chest 2 Views   Final Result   IMPRESSION: Cardiac silhouette is within normal limits. No focal airspace consolidation. No pleural effusion or pneumothorax. Right axillary surgical clips.        Independent Interpretation:   I personally reviewed CXR images, I see no large PTX or infiltrate.    ED Course      Medications Administered   Medications - No data to display    ED Course and Discussion of Management   ED Course as of 02/28/25 1948 Fri Feb 28, 2025 1738 I rechecked  patient, discussed test results.       Additional Documentation  Social Determinants of Health: Stress/Adjustment Disorders     MIPS       None    Medical Decision Making / Diagnosis   Medical Decision Making:  The specific etiology of her chest discomfort is unconfirmed despite detailed evaluation as above.  Patient repeatedly volunteers that she thinks it is from stress, citing the emotional and physical strain of caring for her dying , this is certainly plausible though I felt it was appropriate to evaluate for alternate and potentially dangerous medical condition such as acute coronary syndrome.  Fortunately, serial troponins are negative.  She remains asymptomatic.  The emergency department.  Given current vitals and her asymptomatic state I felt that pulmonary embolism was sufficiently unlikely that formal testing can be deferred.  Also considered aortic dissection and other vascular pathologies in light of her right arm symptoms so I think this is unlikely, no signs of compartment syndrome or arterial compromise, right radial pulses normal.  No right arm swelling to suggest DVT.  Highly doubt thoracic outlet syndrome.  No acute neurologic deficits.  Do not think that neck angiography is indicated.  Highly doubt stroke.  Diagnostic uncertainty was reviewed with the patient along with the reassurances from her test.  She is eager for discharge, and in fact states that she does not want to be hospitalized whatsoever, and in light of her workup results I do think she can be recently discharged.  Return here for unexpected sudden worsening at any hour.  Close follow-up through primary care is advised.    Disposition   Discharge    Diagnosis     ICD-10-CM    1. Chest discomfort  R07.89       2. Caregiver stress  Z63.6       3. Carpal tunnel syndrome of right wrist  G56.01     previously diagnosed         Due to hospital and departmental capacity constraints and prolonged wait times, this patient was  evaluated in non-traditional circumstances such as in triage/waiting room, a hallway, etc. I explained the option to wait for a traditional treatment space and apologized for the inconvenience. Given the circumstances, every attempt was made to provide for the patient's comfort and privacy and to perform the most thorough evaluation possible.      2/28/2025   MD Maximus Reis Jeffrey Alan, MD  02/28/25 1949

## 2025-02-28 NOTE — ED TRIAGE NOTES
Patient started to have carpal tunnel symptoms to her right arm, that started to radiate her arm into her neck. Patient is having chest pressure. Patient did state this might be stress, her  is currently in late stages of hospice at home.

## 2025-03-03 ENCOUNTER — ANCILLARY PROCEDURE (OUTPATIENT)
Dept: CT IMAGING | Facility: CLINIC | Age: 85
End: 2025-03-03
Attending: THORACIC SURGERY (CARDIOTHORACIC VASCULAR SURGERY)
Payer: COMMERCIAL

## 2025-03-03 DIAGNOSIS — C37 MALIGNANT THYMOMA (H): ICD-10-CM

## 2025-03-03 LAB
CREAT BLD-MCNC: 0.8 MG/DL (ref 0.5–1)
EGFRCR SERPLBLD CKD-EPI 2021: >60 ML/MIN/1.73M2

## 2025-03-03 PROCEDURE — 71260 CT THORAX DX C+: CPT

## 2025-03-03 PROCEDURE — 250N000009 HC RX 250: Performed by: THORACIC SURGERY (CARDIOTHORACIC VASCULAR SURGERY)

## 2025-03-03 PROCEDURE — 82565 ASSAY OF CREATININE: CPT

## 2025-03-03 PROCEDURE — 250N000011 HC RX IP 250 OP 636: Performed by: THORACIC SURGERY (CARDIOTHORACIC VASCULAR SURGERY)

## 2025-03-03 RX ORDER — IOPAMIDOL 755 MG/ML
71 INJECTION, SOLUTION INTRAVASCULAR ONCE
Status: COMPLETED | OUTPATIENT
Start: 2025-03-03 | End: 2025-03-03

## 2025-03-03 RX ADMIN — SODIUM CHLORIDE 40 ML: 9 INJECTION, SOLUTION INTRAVENOUS at 12:36

## 2025-03-03 RX ADMIN — IOPAMIDOL 71 ML: 755 INJECTION, SOLUTION INTRAVENOUS at 12:36

## (undated) RX ORDER — FENTANYL CITRATE 50 UG/ML
INJECTION, SOLUTION INTRAMUSCULAR; INTRAVENOUS
Status: DISPENSED
Start: 2018-01-31

## (undated) RX ORDER — FENTANYL CITRATE 50 UG/ML
INJECTION, SOLUTION INTRAMUSCULAR; INTRAVENOUS
Status: DISPENSED
Start: 2021-04-21